# Patient Record
Sex: FEMALE | Race: WHITE | NOT HISPANIC OR LATINO
[De-identification: names, ages, dates, MRNs, and addresses within clinical notes are randomized per-mention and may not be internally consistent; named-entity substitution may affect disease eponyms.]

---

## 2023-08-17 PROBLEM — Z00.00 ENCOUNTER FOR PREVENTIVE HEALTH EXAMINATION: Status: ACTIVE | Noted: 2023-08-17

## 2023-08-28 ENCOUNTER — APPOINTMENT (OUTPATIENT)
Dept: PULMONOLOGY | Facility: CLINIC | Age: 83
End: 2023-08-28
Payer: MEDICARE

## 2023-08-28 ENCOUNTER — NON-APPOINTMENT (OUTPATIENT)
Age: 83
End: 2023-08-28

## 2023-08-28 VITALS
HEIGHT: 64 IN | WEIGHT: 129 LBS | BODY MASS INDEX: 22.02 KG/M2 | DIASTOLIC BLOOD PRESSURE: 80 MMHG | TEMPERATURE: 96.8 F | HEART RATE: 88 BPM | SYSTOLIC BLOOD PRESSURE: 122 MMHG | OXYGEN SATURATION: 93 %

## 2023-08-28 DIAGNOSIS — Z80.1 FAMILY HISTORY OF MALIGNANT NEOPLASM OF TRACHEA, BRONCHUS AND LUNG: ICD-10-CM

## 2023-08-28 DIAGNOSIS — R91.8 OTHER NONSPECIFIC ABNORMAL FINDING OF LUNG FIELD: ICD-10-CM

## 2023-08-28 PROCEDURE — 99204 OFFICE O/P NEW MOD 45 MIN: CPT

## 2023-08-28 RX ORDER — DOCUSATE SODIUM 100 MG/1
TABLET ORAL
Refills: 0 | Status: ACTIVE | COMMUNITY

## 2023-08-28 RX ORDER — AMLODIPINE BESYLATE 5 MG/1
TABLET ORAL
Refills: 0 | Status: ACTIVE | COMMUNITY

## 2023-08-28 RX ORDER — ROSUVASTATIN CALCIUM 5 MG/1
TABLET, FILM COATED ORAL
Refills: 0 | Status: ACTIVE | COMMUNITY

## 2023-08-28 RX ORDER — HYDROXYUREA 500 MG/1
CAPSULE ORAL
Refills: 0 | Status: ACTIVE | COMMUNITY

## 2023-08-28 RX ORDER — ASPIRIN/CODEINE PHOSPHATE 325MG-60MG
325-60 TABLET ORAL
Refills: 0 | Status: ACTIVE | COMMUNITY

## 2023-08-28 RX ORDER — ALBUTEROL SULFATE 90 UG/1
INHALANT RESPIRATORY (INHALATION)
Refills: 0 | Status: ACTIVE | COMMUNITY

## 2023-08-28 NOTE — REASON FOR VISIT
[Initial] : an initial visit [Abnormal CXR/ Chest CT] : an abnormal CXR/ chest CT [Cough] : cough [Family Member] : family member [TextBox_44] : Patient sent by GP for abnormal CT scan. Patient reports coughing and SOB when speaking.

## 2023-08-28 NOTE — ASSESSMENT
[FreeTextEntry1] : 8/28/2023 Radha Edwards is 83-year-old white female with persistent respiratory symptoms.  Patient  had a chest x-ray and it revealed what appeared to be an infiltrate in the right lower lobe and right hilar adenopathy.  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  I showed the family the findings on the imaging that was done at [facility].  Explained to the family that the patient could have a biopsy of the lesion done by interventional radiology where they placed the needle into the lesion.  The lesion is up against the pleural surface and it would mitigate against any additional risk of puncture of the lung given the patient has such severe emphysema.  Further staging of the cancer can be done with a PET scan without performing a EBUS bronchoscopy as patient would not be a surgical candidate she had DLCO 34% in 2019.  I have suggested the patient use codeine derivatives such as Hycodan for her cough.  Time spent 45 minutes counseling, education, documentation, imaging reviewed, medication reviewed,, old records reviewed, HX and PE

## 2023-08-28 NOTE — PHYSICAL EXAM
[No Acute Distress] : no acute distress [Normal Oropharynx] : normal oropharynx [Normal Appearance] : normal appearance [No Neck Mass] : no neck mass [Normal Rate/Rhythm] : normal rate/rhythm [Normal S1, S2] : normal s1, s2 [No Murmurs] : no murmurs [No Resp Distress] : no resp distress [Clear to Auscultation Bilaterally] : clear to auscultation bilaterally [No Abnormalities] : no abnormalities [Benign] : benign [Normal Gait] : normal gait [No Clubbing] : no clubbing [No Cyanosis] : no cyanosis [No Edema] : no edema [FROM] : FROM [Normal Color/ Pigmentation] : normal color/ pigmentation [No Focal Deficits] : no focal deficits [Oriented x3] : oriented x3 [Normal Affect] : normal affect [TextBox_2] : BMI 22 [TextBox_68] : Markedly reduced breath sounds in the right lower lobe with dullness dullness

## 2023-08-28 NOTE — HISTORY OF PRESENT ILLNESS
[Former] : former [TextBox_4] : 8/28/2023todd Miles is a 83-year-old female who comes to the office for evaluation of right lower lobe infiltrate question of right hilar fullness.  The patient's son and daughter are present.  The patient states her cough has been for last month or 2 and increasing over the last few weeks.  The children state that her cough is 6 months to year and has gotten progressively worse.  The patient's cough is mostly dry.  The patient does not have any wheeze.  Patient is known to have a form a heavy smoker smoking 1 and half pack cigarettes a day for 60 years.  He quit in 2012 [TextBox_11] : 1.5 [TextBox_13] : 61 [YearQuit] : 2012

## 2023-08-28 NOTE — REVIEW OF SYSTEMS
[Recent Wt Gain (___ Lbs)] : ~T no recent weight gain [Recent Wt Loss (___ Lbs)] : ~T no recent weight loss [Postnasal Drip] : no postnasal drip [Sinus Problems] : sinus problems [SOB on Exertion] : sob on exertion [Back Pain] : back pain [TextBox_83] : uti [TextBox_122] : s/p CVA recovered  and now it was questioned  [Nasal Congestion] : nasal congestion [Cough] : cough [Sputum] : no sputum [Dyspnea] : dyspnea [Wheezing] : no wheezing [Seasonal Allergies] : no seasonal allergies [GERD] : no gerd [Diarrhea] : no diarrhea [Constipation] : no constipation [Dysphagia] : no dysphagia [Nocturia] : nocturia [Arthralgias] : arthralgias [Myalgias] : myalgias [Anemia] : no anemia [Headache] : no headache [Dizziness] : no dizziness [Numbness] : no numbness [Memory Loss] : no memory loss [Depression] : no depression [Anxiety] : no anxiety [Panic Attacks] : no panic attacks [Diabetes] : no diabetes [Thyroid Problem] : no thyroid problem [Obesity] : no obesity [Negative] : Endocrine

## 2023-08-30 RX ORDER — HYDROCODONE BITARTRATE AND HOMATROPINE METHYLBROMIDE 1.5; 5 MG/5ML; MG/5ML
5-1.5 SOLUTION ORAL 3 TIMES DAILY
Qty: 450 | Refills: 0 | Status: ACTIVE | COMMUNITY
Start: 2023-08-30 | End: 1900-01-01

## 2023-09-05 RX ORDER — CLOPIDOGREL 75 MG/1
75 TABLET, FILM COATED ORAL
Refills: 0 | Status: ACTIVE | COMMUNITY

## 2023-09-15 ENCOUNTER — NON-APPOINTMENT (OUTPATIENT)
Age: 83
End: 2023-09-15

## 2023-09-18 ENCOUNTER — APPOINTMENT (OUTPATIENT)
Dept: PULMONOLOGY | Facility: CLINIC | Age: 83
End: 2023-09-18